# Patient Record
Sex: MALE | Race: BLACK OR AFRICAN AMERICAN
[De-identification: names, ages, dates, MRNs, and addresses within clinical notes are randomized per-mention and may not be internally consistent; named-entity substitution may affect disease eponyms.]

---

## 2018-02-01 ENCOUNTER — HOSPITAL ENCOUNTER (INPATIENT)
Dept: HOSPITAL 91 - E/R | Age: 58
LOS: 9 days | Discharge: HOME | DRG: 147 | End: 2018-02-10
Payer: COMMERCIAL

## 2018-02-01 DIAGNOSIS — K12.30: ICD-10-CM

## 2018-02-01 DIAGNOSIS — C32.1: Primary | ICD-10-CM

## 2018-02-01 DIAGNOSIS — N17.9: ICD-10-CM

## 2018-02-01 DIAGNOSIS — R04.2: ICD-10-CM

## 2018-02-01 DIAGNOSIS — Z87.891: ICD-10-CM

## 2018-02-01 DIAGNOSIS — D50.9: ICD-10-CM

## 2018-02-01 DIAGNOSIS — R13.10: ICD-10-CM

## 2018-02-01 DIAGNOSIS — E86.0: ICD-10-CM

## 2018-02-01 DIAGNOSIS — R59.0: ICD-10-CM

## 2018-02-01 PROCEDURE — 80053 COMPREHEN METABOLIC PANEL: CPT

## 2018-02-01 PROCEDURE — 36561 INSERT TUNNELED CV CATH: CPT

## 2018-02-01 PROCEDURE — 94640 AIRWAY INHALATION TREATMENT: CPT

## 2018-02-01 PROCEDURE — 36415 COLL VENOUS BLD VENIPUNCTURE: CPT

## 2018-02-01 PROCEDURE — 86850 RBC ANTIBODY SCREEN: CPT

## 2018-02-01 PROCEDURE — 85025 COMPLETE CBC W/AUTO DIFF WBC: CPT

## 2018-02-01 PROCEDURE — 71045 X-RAY EXAM CHEST 1 VIEW: CPT

## 2018-02-01 PROCEDURE — 94664 DEMO&/EVAL PT USE INHALER: CPT

## 2018-02-01 PROCEDURE — 85384 FIBRINOGEN ACTIVITY: CPT

## 2018-02-01 PROCEDURE — 85610 PROTHROMBIN TIME: CPT

## 2018-02-01 PROCEDURE — 80061 LIPID PANEL: CPT

## 2018-02-01 PROCEDURE — 76942 ECHO GUIDE FOR BIOPSY: CPT

## 2018-02-01 PROCEDURE — 85014 HEMATOCRIT: CPT

## 2018-02-01 PROCEDURE — 36569 INSJ PICC 5 YR+ W/O IMAGING: CPT

## 2018-02-01 PROCEDURE — 93306 TTE W/DOPPLER COMPLETE: CPT

## 2018-02-01 PROCEDURE — 96375 TX/PRO/DX INJ NEW DRUG ADDON: CPT

## 2018-02-01 PROCEDURE — 99285 EMERGENCY DEPT VISIT HI MDM: CPT

## 2018-02-01 PROCEDURE — 83735 ASSAY OF MAGNESIUM: CPT

## 2018-02-01 PROCEDURE — 76937 US GUIDE VASCULAR ACCESS: CPT

## 2018-02-01 PROCEDURE — 85730 THROMBOPLASTIN TIME PARTIAL: CPT

## 2018-02-01 PROCEDURE — 86901 BLOOD TYPING SEROLOGIC RH(D): CPT

## 2018-02-01 PROCEDURE — 84100 ASSAY OF PHOSPHORUS: CPT

## 2018-02-01 PROCEDURE — 85018 HEMOGLOBIN: CPT

## 2018-02-01 PROCEDURE — 86900 BLOOD TYPING SEROLOGIC ABO: CPT

## 2018-02-01 PROCEDURE — 96374 THER/PROPH/DIAG INJ IV PUSH: CPT

## 2018-02-01 PROCEDURE — 93005 ELECTROCARDIOGRAM TRACING: CPT

## 2018-02-01 PROCEDURE — 80048 BASIC METABOLIC PNL TOTAL CA: CPT

## 2018-02-02 ENCOUNTER — HOSPITAL ENCOUNTER (INPATIENT)
Age: 58
LOS: 8 days | Discharge: HOME | DRG: 147 | End: 2018-02-10

## 2018-02-02 LAB
ADD MAN DIFF?: NO
ALANINE AMINOTRANSFERASE: 32 IU/L (ref 13–69)
ALBUMIN/GLOBULIN RATIO: 1.37
ALBUMIN: 4 G/DL (ref 3.3–4.9)
ALKALINE PHOSPHATASE: 77 IU/L (ref 42–121)
ANION GAP: 11 (ref 8–16)
ASPARTATE AMINO TRANSFERASE: 31 IU/L (ref 15–46)
BASOPHIL #: 0 10^3/UL (ref 0–0.1)
BASOPHILS %: 0.2 % (ref 0–2)
BILIRUBIN,DIRECT: 0 MG/DL (ref 0–0.2)
BILIRUBIN,TOTAL: 0.1 MG/DL (ref 0.2–1.3)
BLOOD UREA NITROGEN: 19 MG/DL (ref 7–20)
CALCIUM: 9.4 MG/DL (ref 8.4–10.2)
CARBON DIOXIDE: 31 MMOL/L (ref 21–31)
CHLORIDE: 104 MMOL/L (ref 97–110)
CREATININE: 1.06 MG/DL (ref 0.61–1.24)
EOSINOPHILS #: 0 10^3/UL (ref 0–0.5)
EOSINOPHILS %: 0.7 % (ref 0–7)
FIBRINOGEN: 343 MG/DL (ref 207–461)
GLOBULIN: 2.9 G/DL (ref 1.3–3.2)
GLUCOSE: 98 MG/DL (ref 70–220)
HEMATOCRIT: 37.3 % (ref 42–52)
HEMATOCRIT: 38.6 % (ref 42–52)
HEMOGLOBIN: 12.4 G/DL (ref 14–18)
HEMOGLOBIN: 12.8 G/DL (ref 14–18)
INR: 1
LYMPHOCYTES #: 1.3 10^3/UL (ref 0.8–2.9)
LYMPHOCYTES %: 23.9 % (ref 15–51)
MEAN CORPUSCULAR HEMOGLOBIN: 31 PG (ref 29–33)
MEAN CORPUSCULAR HGB CONC: 33.2 G/DL (ref 32–37)
MEAN CORPUSCULAR VOLUME: 93.3 FL (ref 82–101)
MEAN PLATELET VOLUME: 11.4 FL (ref 7.4–10.4)
MONOCYTE #: 0.5 10^3/UL (ref 0.3–0.9)
MONOCYTES %: 9.7 % (ref 0–11)
NEUTROPHIL #: 3.5 10^3/UL (ref 1.6–7.5)
NEUTROPHILS %: 65.3 % (ref 39–77)
NUCLEATED RED BLOOD CELLS #: 0 10^3/UL (ref 0–0)
NUCLEATED RED BLOOD CELLS%: 0 /100WBC (ref 0–0)
PARTIAL THROMBOPLASTIN TIME: 37.4 SEC (ref 25–35)
PLATELET COUNT: 177 10^3/UL (ref 140–415)
POTASSIUM: 4 MMOL/L (ref 3.5–5.1)
PROTIME: 13.3 SEC (ref 11.9–14.9)
PT RATIO: 1
RED BLOOD COUNT: 4 10^6/UL (ref 4.7–6.1)
RED CELL DISTRIBUTION WIDTH: 12.7 % (ref 11.5–14.5)
SODIUM: 142 MMOL/L (ref 135–144)
TOTAL PROTEIN: 6.9 G/DL (ref 6.1–8.1)
WHITE BLOOD COUNT: 5.4 10^3/UL (ref 4.8–10.8)

## 2018-02-02 RX ADMIN — FAMOTIDINE 1 MG: 10 INJECTION, SOLUTION INTRAVENOUS at 20:36

## 2018-02-02 RX ADMIN — DOCUSATE SODIUM 1 MG: 100 CAPSULE, LIQUID FILLED ORAL at 20:50

## 2018-02-02 RX ADMIN — HYDROMORPHONE HYDROCHLORIDE 1 MG: 1 INJECTION, SOLUTION INTRAMUSCULAR; INTRAVENOUS; SUBCUTANEOUS at 08:44

## 2018-02-02 RX ADMIN — HYDROMORPHONE HYDROCHLORIDE 1 MG: 1 INJECTION, SOLUTION INTRAMUSCULAR; INTRAVENOUS; SUBCUTANEOUS at 20:50

## 2018-02-02 RX ADMIN — ONDANSETRON HYDROCHLORIDE 1 MG: 2 INJECTION, SOLUTION INTRAMUSCULAR; INTRAVENOUS at 08:43

## 2018-02-02 RX ADMIN — FAMOTIDINE 1 MG: 10 INJECTION, SOLUTION INTRAVENOUS at 08:43

## 2018-02-02 RX ADMIN — THIAMINE HYDROCHLORIDE 1 MLS/HR: 100 INJECTION, SOLUTION INTRAMUSCULAR; INTRAVENOUS at 20:49

## 2018-02-02 RX ADMIN — THIAMINE HYDROCHLORIDE 1 MLS/HR: 100 INJECTION, SOLUTION INTRAMUSCULAR; INTRAVENOUS at 07:45

## 2018-02-02 RX ADMIN — THIAMINE HYDROCHLORIDE 1 MLS/HR: 100 INJECTION, SOLUTION INTRAMUSCULAR; INTRAVENOUS at 16:03

## 2018-02-02 RX ADMIN — HYDROMORPHONE HYDROCHLORIDE 1 MG: 1 INJECTION, SOLUTION INTRAMUSCULAR; INTRAVENOUS; SUBCUTANEOUS at 16:52

## 2018-02-03 LAB
ADD MAN DIFF?: NO
ALANINE AMINOTRANSFERASE: 34 IU/L (ref 13–69)
ALBUMIN/GLOBULIN RATIO: 1.15
ALBUMIN: 3.7 G/DL (ref 3.3–4.9)
ALKALINE PHOSPHATASE: 70 IU/L (ref 42–121)
ANION GAP: 11 (ref 8–16)
ANION GAP: 12 (ref 8–16)
ASPARTATE AMINO TRANSFERASE: 30 IU/L (ref 15–46)
BASOPHIL #: 0 10^3/UL (ref 0–0.1)
BASOPHILS %: 0.2 % (ref 0–2)
BILIRUBIN,DIRECT: 0 MG/DL (ref 0–0.2)
BILIRUBIN,TOTAL: 0.3 MG/DL (ref 0.2–1.3)
BLOOD UREA NITROGEN: 10 MG/DL (ref 7–20)
BLOOD UREA NITROGEN: 9 MG/DL (ref 7–20)
CALCIUM: 8.6 MG/DL (ref 8.4–10.2)
CALCIUM: 9 MG/DL (ref 8.4–10.2)
CARBON DIOXIDE: 31 MMOL/L (ref 21–31)
CARBON DIOXIDE: 32 MMOL/L (ref 21–31)
CHLORIDE: 103 MMOL/L (ref 97–110)
CHLORIDE: 103 MMOL/L (ref 97–110)
CHOL/HDL RATIO: 3 RATIO
CHOLESTEROL: 189 MG/DL (ref 100–200)
CREATININE: 0.77 MG/DL (ref 0.61–1.24)
CREATININE: 0.8 MG/DL (ref 0.61–1.24)
EOSINOPHILS #: 0 10^3/UL (ref 0–0.5)
EOSINOPHILS %: 0.6 % (ref 0–7)
GLOBULIN: 3.2 G/DL (ref 1.3–3.2)
GLUCOSE: 108 MG/DL (ref 70–220)
GLUCOSE: 110 MG/DL (ref 70–220)
HDL CHOLESTEROL: 61 MG/DL (ref 28–71)
HEMATOCRIT: 39.1 % (ref 42–52)
HEMOGLOBIN: 13.1 G/DL (ref 14–18)
LDL CHOLESTEROL,CALCULATED: 119 MG/DL
LYMPHOCYTES #: 1.7 10^3/UL (ref 0.8–2.9)
LYMPHOCYTES %: 26.2 % (ref 15–51)
MAGNESIUM: 2 MG/DL (ref 1.7–2.5)
MEAN CORPUSCULAR HEMOGLOBIN: 31.4 PG (ref 29–33)
MEAN CORPUSCULAR HGB CONC: 33.5 G/DL (ref 32–37)
MEAN CORPUSCULAR VOLUME: 93.8 FL (ref 82–101)
MEAN PLATELET VOLUME: 11.9 FL (ref 7.4–10.4)
MONOCYTE #: 0.6 10^3/UL (ref 0.3–0.9)
MONOCYTES %: 9.5 % (ref 0–11)
NEUTROPHIL #: 4 10^3/UL (ref 1.6–7.5)
NEUTROPHILS %: 63.3 % (ref 39–77)
NUCLEATED RED BLOOD CELLS #: 0 10^3/UL (ref 0–0)
NUCLEATED RED BLOOD CELLS%: 0 /100WBC (ref 0–0)
PLATELET COUNT: 181 10^3/UL (ref 140–415)
POTASSIUM: 4.1 MMOL/L (ref 3.5–5.1)
POTASSIUM: 4.2 MMOL/L (ref 3.5–5.1)
RED BLOOD COUNT: 4.17 10^6/UL (ref 4.7–6.1)
RED CELL DISTRIBUTION WIDTH: 12.3 % (ref 11.5–14.5)
SODIUM: 142 MMOL/L (ref 135–144)
SODIUM: 142 MMOL/L (ref 135–144)
TOTAL PROTEIN: 6.9 G/DL (ref 6.1–8.1)
TRIGLYCERIDES: 45 MG/DL (ref 0–149)
WHITE BLOOD COUNT: 6.3 10^3/UL (ref 4.8–10.8)

## 2018-02-03 RX ADMIN — ACETAMINOPHEN 1 MG: 325 TABLET, FILM COATED ORAL at 08:15

## 2018-02-03 RX ADMIN — FAMOTIDINE 1 MG: 10 INJECTION, SOLUTION INTRAVENOUS at 21:58

## 2018-02-03 RX ADMIN — HYDROMORPHONE HYDROCHLORIDE 1 MG: 1 INJECTION, SOLUTION INTRAMUSCULAR; INTRAVENOUS; SUBCUTANEOUS at 04:24

## 2018-02-03 RX ADMIN — FAMOTIDINE 1 MG: 10 INJECTION, SOLUTION INTRAVENOUS at 08:16

## 2018-02-03 RX ADMIN — HYDROMORPHONE HYDROCHLORIDE 1 MG: 1 INJECTION, SOLUTION INTRAMUSCULAR; INTRAVENOUS; SUBCUTANEOUS at 16:47

## 2018-02-03 RX ADMIN — HYDROMORPHONE HYDROCHLORIDE 1 MG: 1 INJECTION, SOLUTION INTRAMUSCULAR; INTRAVENOUS; SUBCUTANEOUS at 23:48

## 2018-02-03 RX ADMIN — THIAMINE HYDROCHLORIDE 1 MLS/HR: 100 INJECTION, SOLUTION INTRAMUSCULAR; INTRAVENOUS at 21:58

## 2018-02-03 RX ADMIN — LIDOCAINE HYDROCHLORIDE 1 ML: 10 INJECTION, SOLUTION EPIDURAL; INFILTRATION; INTRACAUDAL; PERINEURAL at 09:56

## 2018-02-03 RX ADMIN — THIAMINE HYDROCHLORIDE 1 MLS/HR: 100 INJECTION, SOLUTION INTRAMUSCULAR; INTRAVENOUS at 06:34

## 2018-02-04 LAB
ADD MAN DIFF?: NO
ALANINE AMINOTRANSFERASE: 32 IU/L (ref 13–69)
ALBUMIN/GLOBULIN RATIO: 1.31
ALBUMIN: 3.8 G/DL (ref 3.3–4.9)
ALKALINE PHOSPHATASE: 71 IU/L (ref 42–121)
ANION GAP: 12 (ref 8–16)
ASPARTATE AMINO TRANSFERASE: 28 IU/L (ref 15–46)
BASOPHIL #: 0 10^3/UL (ref 0–0.1)
BASOPHILS %: 0.2 % (ref 0–2)
BILIRUBIN,DIRECT: 0 MG/DL (ref 0–0.2)
BILIRUBIN,TOTAL: 0.3 MG/DL (ref 0.2–1.3)
BLOOD UREA NITROGEN: 9 MG/DL (ref 7–20)
CALCIUM: 8.6 MG/DL (ref 8.4–10.2)
CARBON DIOXIDE: 32 MMOL/L (ref 21–31)
CHLORIDE: 102 MMOL/L (ref 97–110)
CREATININE: 0.79 MG/DL (ref 0.61–1.24)
EOSINOPHILS #: 0 10^3/UL (ref 0–0.5)
EOSINOPHILS %: 0.9 % (ref 0–7)
GLOBULIN: 2.9 G/DL (ref 1.3–3.2)
GLUCOSE: 112 MG/DL (ref 70–220)
HEMATOCRIT: 37.9 % (ref 42–52)
HEMOGLOBIN: 12.5 G/DL (ref 14–18)
LYMPHOCYTES #: 1.1 10^3/UL (ref 0.8–2.9)
LYMPHOCYTES %: 24.9 % (ref 15–51)
MAGNESIUM: 2 MG/DL (ref 1.7–2.5)
MEAN CORPUSCULAR HEMOGLOBIN: 30.7 PG (ref 29–33)
MEAN CORPUSCULAR HGB CONC: 33 G/DL (ref 32–37)
MEAN CORPUSCULAR VOLUME: 93.1 FL (ref 82–101)
MEAN PLATELET VOLUME: 11.8 FL (ref 7.4–10.4)
MONOCYTE #: 0.6 10^3/UL (ref 0.3–0.9)
MONOCYTES %: 12.6 % (ref 0–11)
NEUTROPHIL #: 2.7 10^3/UL (ref 1.6–7.5)
NEUTROPHILS %: 61.2 % (ref 39–77)
NUCLEATED RED BLOOD CELLS #: 0 10^3/UL (ref 0–0)
NUCLEATED RED BLOOD CELLS%: 0 /100WBC (ref 0–0)
PLATELET COUNT: 168 10^3/UL (ref 140–415)
POTASSIUM: 3.8 MMOL/L (ref 3.5–5.1)
RED BLOOD COUNT: 4.07 10^6/UL (ref 4.7–6.1)
RED CELL DISTRIBUTION WIDTH: 12.3 % (ref 11.5–14.5)
SODIUM: 142 MMOL/L (ref 135–144)
TOTAL PROTEIN: 6.7 G/DL (ref 6.1–8.1)
WHITE BLOOD COUNT: 4.5 10^3/UL (ref 4.8–10.8)

## 2018-02-04 RX ADMIN — IPRATROPIUM BROMIDE AND ALBUTEROL SULFATE 1 ML: .5; 3 SOLUTION RESPIRATORY (INHALATION) at 20:07

## 2018-02-04 RX ADMIN — HYDROMORPHONE HYDROCHLORIDE 1 MG: 1 INJECTION, SOLUTION INTRAMUSCULAR; INTRAVENOUS; SUBCUTANEOUS at 16:59

## 2018-02-04 RX ADMIN — HYDROMORPHONE HYDROCHLORIDE 1 MG: 1 INJECTION, SOLUTION INTRAMUSCULAR; INTRAVENOUS; SUBCUTANEOUS at 06:37

## 2018-02-04 RX ADMIN — IPRATROPIUM BROMIDE AND ALBUTEROL SULFATE 1 ML: .5; 3 SOLUTION RESPIRATORY (INHALATION) at 09:25

## 2018-02-04 RX ADMIN — FAMOTIDINE 1 MG: 10 INJECTION, SOLUTION INTRAVENOUS at 09:05

## 2018-02-04 RX ADMIN — GUAIFENESIN AND CODEINE PHOSPHATE 1 ML: 100; 10 SOLUTION ORAL at 16:59

## 2018-02-04 RX ADMIN — HYDROMORPHONE HYDROCHLORIDE 1 MG: 1 INJECTION, SOLUTION INTRAMUSCULAR; INTRAVENOUS; SUBCUTANEOUS at 12:50

## 2018-02-04 RX ADMIN — GUAIFENESIN AND CODEINE PHOSPHATE 1 ML: 100; 10 SOLUTION ORAL at 06:36

## 2018-02-04 RX ADMIN — FAMOTIDINE 1 MG: 10 INJECTION, SOLUTION INTRAVENOUS at 20:25

## 2018-02-04 RX ADMIN — THIAMINE HYDROCHLORIDE 1 MLS/HR: 100 INJECTION, SOLUTION INTRAMUSCULAR; INTRAVENOUS at 06:34

## 2018-02-04 RX ADMIN — THIAMINE HYDROCHLORIDE 1 MLS/HR: 100 INJECTION, SOLUTION INTRAMUSCULAR; INTRAVENOUS at 16:52

## 2018-02-04 RX ADMIN — HYDROMORPHONE HYDROCHLORIDE 1 MG: 1 INJECTION, SOLUTION INTRAMUSCULAR; INTRAVENOUS; SUBCUTANEOUS at 23:01

## 2018-02-05 RX ADMIN — DIPHENHYDRAMINE HYDROCHLORIDE 1 MG: 50 INJECTION, SOLUTION INTRAMUSCULAR; INTRAVENOUS at 17:29

## 2018-02-05 RX ADMIN — HYDROMORPHONE HYDROCHLORIDE 1 MG: 1 INJECTION, SOLUTION INTRAMUSCULAR; INTRAVENOUS; SUBCUTANEOUS at 09:45

## 2018-02-05 RX ADMIN — GUAIFENESIN AND CODEINE PHOSPHATE 1 ML: 100; 10 SOLUTION ORAL at 18:21

## 2018-02-05 RX ADMIN — FAMOTIDINE 1 MG: 10 INJECTION, SOLUTION INTRAVENOUS at 20:31

## 2018-02-05 RX ADMIN — IPRATROPIUM BROMIDE AND ALBUTEROL SULFATE 1 ML: .5; 3 SOLUTION RESPIRATORY (INHALATION) at 16:05

## 2018-02-05 RX ADMIN — FAMOTIDINE 1 MG: 10 INJECTION, SOLUTION INTRAVENOUS at 08:32

## 2018-02-05 RX ADMIN — IPRATROPIUM BROMIDE AND ALBUTEROL SULFATE 1 ML: .5; 3 SOLUTION RESPIRATORY (INHALATION) at 10:07

## 2018-02-05 RX ADMIN — ONDANSETRON HYDROCHLORIDE 1 MLS/HR: 2 INJECTION, SOLUTION INTRAMUSCULAR; INTRAVENOUS at 17:19

## 2018-02-05 RX ADMIN — SODIUM CHLORIDE 1 MLS/HR: 0.9 INJECTION, SOLUTION INTRAVENOUS at 20:49

## 2018-02-05 RX ADMIN — HYDROMORPHONE HYDROCHLORIDE 1 MG: 1 INJECTION, SOLUTION INTRAMUSCULAR; INTRAVENOUS; SUBCUTANEOUS at 05:12

## 2018-02-05 RX ADMIN — HYDROMORPHONE HYDROCHLORIDE 1 MG: 1 INJECTION, SOLUTION INTRAMUSCULAR; INTRAVENOUS; SUBCUTANEOUS at 22:08

## 2018-02-05 RX ADMIN — HYDROMORPHONE HYDROCHLORIDE 1 MG: 1 INJECTION, SOLUTION INTRAMUSCULAR; INTRAVENOUS; SUBCUTANEOUS at 18:21

## 2018-02-05 RX ADMIN — HYDROMORPHONE HYDROCHLORIDE 1 MG: 1 INJECTION, SOLUTION INTRAMUSCULAR; INTRAVENOUS; SUBCUTANEOUS at 14:16

## 2018-02-05 RX ADMIN — THIAMINE HYDROCHLORIDE 1 MLS/HR: 100 INJECTION, SOLUTION INTRAMUSCULAR; INTRAVENOUS at 05:11

## 2018-02-05 RX ADMIN — GUAIFENESIN AND CODEINE PHOSPHATE 1 ML: 100; 10 SOLUTION ORAL at 09:56

## 2018-02-05 RX ADMIN — SODIUM CHLORIDE 1 MLS/HR: 0.9 INJECTION, SOLUTION INTRAVENOUS at 17:51

## 2018-02-05 RX ADMIN — THIAMINE HYDROCHLORIDE 1 MLS/HR: 100 INJECTION, SOLUTION INTRAMUSCULAR; INTRAVENOUS at 14:35

## 2018-02-06 LAB
ADD MAN DIFF?: NO
ALANINE AMINOTRANSFERASE: 27 IU/L (ref 13–69)
ALBUMIN/GLOBULIN RATIO: 1.37
ALBUMIN: 4 G/DL (ref 3.3–4.9)
ALKALINE PHOSPHATASE: 75 IU/L (ref 42–121)
ANION GAP: 13 (ref 8–16)
ASPARTATE AMINO TRANSFERASE: 25 IU/L (ref 15–46)
BASOPHIL #: 0 10^3/UL (ref 0–0.1)
BASOPHILS %: 0 % (ref 0–2)
BILIRUBIN,DIRECT: 0 MG/DL (ref 0–0.2)
BILIRUBIN,TOTAL: 0 MG/DL (ref 0.2–1.3)
BLOOD UREA NITROGEN: 9 MG/DL (ref 7–20)
CALCIUM: 8.4 MG/DL (ref 8.4–10.2)
CARBON DIOXIDE: 31 MMOL/L (ref 21–31)
CHLORIDE: 105 MMOL/L (ref 97–110)
CREATININE: 0.72 MG/DL (ref 0.61–1.24)
EOSINOPHILS #: 0 10^3/UL (ref 0–0.5)
EOSINOPHILS %: 0 % (ref 0–7)
GLOBULIN: 2.9 G/DL (ref 1.3–3.2)
GLUCOSE: 132 MG/DL (ref 70–220)
HEMATOCRIT: 37.2 % (ref 42–52)
HEMOGLOBIN: 12.3 G/DL (ref 14–18)
LYMPHOCYTES #: 0.6 10^3/UL (ref 0.8–2.9)
LYMPHOCYTES %: 12.2 % (ref 15–51)
MAGNESIUM: 2 MG/DL (ref 1.7–2.5)
MEAN CORPUSCULAR HEMOGLOBIN: 31 PG (ref 29–33)
MEAN CORPUSCULAR HGB CONC: 33.1 G/DL (ref 32–37)
MEAN CORPUSCULAR VOLUME: 93.7 FL (ref 82–101)
MEAN PLATELET VOLUME: 12.4 FL (ref 7.4–10.4)
MONOCYTE #: 0.1 10^3/UL (ref 0.3–0.9)
MONOCYTES %: 2.2 % (ref 0–11)
NEUTROPHIL #: 4.2 10^3/UL (ref 1.6–7.5)
NEUTROPHILS %: 85.4 % (ref 39–77)
NUCLEATED RED BLOOD CELLS #: 0 10^3/UL (ref 0–0)
NUCLEATED RED BLOOD CELLS%: 0 /100WBC (ref 0–0)
PHOSPHORUS: 4.2 MG/DL (ref 2.5–4.9)
PLATELET COUNT: 171 10^3/UL (ref 140–415)
POTASSIUM: 4.2 MMOL/L (ref 3.5–5.1)
RED BLOOD COUNT: 3.97 10^6/UL (ref 4.7–6.1)
RED CELL DISTRIBUTION WIDTH: 12.4 % (ref 11.5–14.5)
SODIUM: 145 MMOL/L (ref 135–144)
TOTAL PROTEIN: 6.9 G/DL (ref 6.1–8.1)
WHITE BLOOD COUNT: 4.9 10^3/UL (ref 4.8–10.8)

## 2018-02-06 RX ADMIN — HYDROMORPHONE HYDROCHLORIDE 1 MG: 1 INJECTION, SOLUTION INTRAMUSCULAR; INTRAVENOUS; SUBCUTANEOUS at 07:03

## 2018-02-06 RX ADMIN — LIDOCAINE HYDROCHLORIDE 1 ML: 20 SOLUTION ORAL; TOPICAL at 13:59

## 2018-02-06 RX ADMIN — GUAIFENESIN AND CODEINE PHOSPHATE 1 ML: 100; 10 SOLUTION ORAL at 01:57

## 2018-02-06 RX ADMIN — ONDANSETRON HYDROCHLORIDE 1 MLS/HR: 2 INJECTION, SOLUTION INTRAMUSCULAR; INTRAVENOUS at 23:28

## 2018-02-06 RX ADMIN — HYDROMORPHONE HYDROCHLORIDE 1 MG: 1 INJECTION, SOLUTION INTRAMUSCULAR; INTRAVENOUS; SUBCUTANEOUS at 13:02

## 2018-02-06 RX ADMIN — IPRATROPIUM BROMIDE AND ALBUTEROL SULFATE 1 ML: .5; 3 SOLUTION RESPIRATORY (INHALATION) at 16:10

## 2018-02-06 RX ADMIN — IPRATROPIUM BROMIDE AND ALBUTEROL SULFATE 1 ML: .5; 3 SOLUTION RESPIRATORY (INHALATION) at 00:41

## 2018-02-06 RX ADMIN — IPRATROPIUM BROMIDE AND ALBUTEROL SULFATE 1 ML: .5; 3 SOLUTION RESPIRATORY (INHALATION) at 23:44

## 2018-02-06 RX ADMIN — THIAMINE HYDROCHLORIDE 1 MLS/HR: 100 INJECTION, SOLUTION INTRAMUSCULAR; INTRAVENOUS at 03:00

## 2018-02-06 RX ADMIN — FAMOTIDINE 1 MG: 10 INJECTION, SOLUTION INTRAVENOUS at 21:28

## 2018-02-06 RX ADMIN — THIAMINE HYDROCHLORIDE 1 MLS/HR: 100 INJECTION, SOLUTION INTRAMUSCULAR; INTRAVENOUS at 23:00

## 2018-02-06 RX ADMIN — HYDROMORPHONE HYDROCHLORIDE 1 MG: 1 INJECTION, SOLUTION INTRAMUSCULAR; INTRAVENOUS; SUBCUTANEOUS at 02:00

## 2018-02-06 RX ADMIN — LIDOCAINE HYDROCHLORIDE 1 ML: 20 SOLUTION ORAL; TOPICAL at 23:40

## 2018-02-06 RX ADMIN — FLUOROURACIL 1 MLS/HR: 50 INJECTION, SOLUTION INTRAVENOUS at 00:02

## 2018-02-06 RX ADMIN — DIPHENHYDRAMINE HYDROCHLORIDE 1 MG: 50 INJECTION, SOLUTION INTRAMUSCULAR; INTRAVENOUS at 23:27

## 2018-02-06 RX ADMIN — FAMOTIDINE 1 MG: 10 INJECTION, SOLUTION INTRAVENOUS at 09:34

## 2018-02-06 RX ADMIN — GUAIFENESIN AND CODEINE PHOSPHATE 1 ML: 100; 10 SOLUTION ORAL at 12:59

## 2018-02-06 RX ADMIN — HYDROMORPHONE HYDROCHLORIDE 1 MG: 1 INJECTION, SOLUTION INTRAMUSCULAR; INTRAVENOUS; SUBCUTANEOUS at 18:38

## 2018-02-06 RX ADMIN — THIAMINE HYDROCHLORIDE 1 MLS/HR: 100 INJECTION, SOLUTION INTRAMUSCULAR; INTRAVENOUS at 23:27

## 2018-02-07 LAB
ABNORMAL IP MESSAGE: 1
ADD MAN DIFF?: NO
ANION GAP: 9 (ref 8–16)
BASOPHIL #: 0 10^3/UL (ref 0–0.1)
BASOPHILS %: 0 % (ref 0–2)
BLOOD UREA NITROGEN: 14 MG/DL (ref 7–20)
CALCIUM: 8.6 MG/DL (ref 8.4–10.2)
CARBON DIOXIDE: 34 MMOL/L (ref 21–31)
CHLORIDE: 102 MMOL/L (ref 97–110)
CREATININE: 0.79 MG/DL (ref 0.61–1.24)
EOSINOPHILS #: 0 10^3/UL (ref 0–0.5)
EOSINOPHILS %: 0 % (ref 0–7)
GLUCOSE: 134 MG/DL (ref 70–220)
HEMATOCRIT: 37.6 % (ref 42–52)
HEMOGLOBIN: 12.6 G/DL (ref 14–18)
LYMPHOCYTES #: 0.4 10^3/UL (ref 0.8–2.9)
LYMPHOCYTES %: 7.3 % (ref 15–51)
MAGNESIUM: 2.1 MG/DL (ref 1.7–2.5)
MEAN CORPUSCULAR HEMOGLOBIN: 31 PG (ref 29–33)
MEAN CORPUSCULAR HGB CONC: 33.5 G/DL (ref 32–37)
MEAN CORPUSCULAR VOLUME: 92.6 FL (ref 82–101)
MEAN PLATELET VOLUME: 12.5 FL (ref 7.4–10.4)
MONOCYTE #: 0.2 10^3/UL (ref 0.3–0.9)
MONOCYTES %: 3.1 % (ref 0–11)
NEUTROPHIL #: 5.1 10^3/UL (ref 1.6–7.5)
NEUTROPHILS %: 89.3 % (ref 39–77)
NUCLEATED RED BLOOD CELLS #: 0 10^3/UL (ref 0–0)
NUCLEATED RED BLOOD CELLS%: 0 /100WBC (ref 0–0)
PHOSPHORUS: 3.8 MG/DL (ref 2.5–4.9)
PLATELET COUNT: 135 10^3/UL (ref 140–415)
POSITIVE DIFF: (no result)
POTASSIUM: 4.3 MMOL/L (ref 3.5–5.1)
RED BLOOD COUNT: 4.06 10^6/UL (ref 4.7–6.1)
RED CELL DISTRIBUTION WIDTH: 12.5 % (ref 11.5–14.5)
SODIUM: 141 MMOL/L (ref 135–144)
WHITE BLOOD COUNT: 5.7 10^3/UL (ref 4.8–10.8)

## 2018-02-07 PROCEDURE — 05HN33Z INSERTION OF INFUSION DEVICE INTO LEFT INTERNAL JUGULAR VEIN, PERCUTANEOUS APPROACH: ICD-10-PCS

## 2018-02-07 PROCEDURE — 0JH63XZ INSERTION OF TUNNELED VASCULAR ACCESS DEVICE INTO CHEST SUBCUTANEOUS TISSUE AND FASCIA, PERCUTANEOUS APPROACH: ICD-10-PCS

## 2018-02-07 PROCEDURE — 02HV33Z INSERTION OF INFUSION DEVICE INTO SUPERIOR VENA CAVA, PERCUTANEOUS APPROACH: ICD-10-PCS

## 2018-02-07 PROCEDURE — 3E03305 INTRODUCTION OF OTHER ANTINEOPLASTIC INTO PERIPHERAL VEIN, PERCUTANEOUS APPROACH: ICD-10-PCS

## 2018-02-07 RX ADMIN — THIAMINE HYDROCHLORIDE 1 MLS/HR: 100 INJECTION, SOLUTION INTRAMUSCULAR; INTRAVENOUS at 19:00

## 2018-02-07 RX ADMIN — FAMOTIDINE 1 MG: 10 INJECTION, SOLUTION INTRAVENOUS at 21:42

## 2018-02-07 RX ADMIN — THIAMINE HYDROCHLORIDE 1 MLS/HR: 100 INJECTION, SOLUTION INTRAMUSCULAR; INTRAVENOUS at 09:00

## 2018-02-07 RX ADMIN — ONDANSETRON HYDROCHLORIDE 1 MLS/HR: 2 INJECTION, SOLUTION INTRAMUSCULAR; INTRAVENOUS at 23:22

## 2018-02-07 RX ADMIN — HYDROMORPHONE HYDROCHLORIDE 1 MG: 1 INJECTION, SOLUTION INTRAMUSCULAR; INTRAVENOUS; SUBCUTANEOUS at 14:13

## 2018-02-07 RX ADMIN — LIDOCAINE HYDROCHLORIDE 1 ML: 10; .005 INJECTION, SOLUTION EPIDURAL; INFILTRATION; INTRACAUDAL; PERINEURAL at 11:40

## 2018-02-07 RX ADMIN — THIAMINE HYDROCHLORIDE 1 MLS/HR: 100 INJECTION, SOLUTION INTRAMUSCULAR; INTRAVENOUS at 14:15

## 2018-02-07 RX ADMIN — DIPHENHYDRAMINE HYDROCHLORIDE 1 MG: 50 INJECTION, SOLUTION INTRAMUSCULAR; INTRAVENOUS at 23:22

## 2018-02-07 RX ADMIN — LIDOCAINE HYDROCHLORIDE 1 ML: 20 SOLUTION ORAL; TOPICAL at 14:14

## 2018-02-07 RX ADMIN — HYDROMORPHONE HYDROCHLORIDE 1 MG: 1 INJECTION, SOLUTION INTRAMUSCULAR; INTRAVENOUS; SUBCUTANEOUS at 19:28

## 2018-02-07 RX ADMIN — FAMOTIDINE 1 MG: 10 INJECTION, SOLUTION INTRAVENOUS at 09:02

## 2018-02-07 RX ADMIN — FLUOROURACIL 1 MLS/HR: 50 INJECTION, SOLUTION INTRAVENOUS at 00:24

## 2018-02-07 RX ADMIN — HEPARIN SODIUM 1 UNIT: 1000 INJECTION, SOLUTION INTRAVENOUS; SUBCUTANEOUS at 11:40

## 2018-02-07 RX ADMIN — BUPIVACAINE HYDROCHLORIDE 1 ML: 5 INJECTION, SOLUTION EPIDURAL; INTRACAUDAL; PERINEURAL at 11:39

## 2018-02-07 RX ADMIN — HYDROMORPHONE HYDROCHLORIDE 1 MG: 1 INJECTION, SOLUTION INTRAMUSCULAR; INTRAVENOUS; SUBCUTANEOUS at 23:22

## 2018-02-07 RX ADMIN — GUAIFENESIN AND CODEINE PHOSPHATE 1 ML: 100; 10 SOLUTION ORAL at 14:22

## 2018-02-07 RX ADMIN — HYDROMORPHONE HYDROCHLORIDE 1 MG: 1 INJECTION, SOLUTION INTRAMUSCULAR; INTRAVENOUS; SUBCUTANEOUS at 09:01

## 2018-02-08 LAB
ABNORMAL IP MESSAGE: 1
ADD MAN DIFF?: NO
ANION GAP: 12 (ref 8–16)
BASOPHIL #: 0 10^3/UL (ref 0–0.1)
BASOPHILS %: 0 % (ref 0–2)
BLOOD UREA NITROGEN: 14 MG/DL (ref 7–20)
CALCIUM: 8.3 MG/DL (ref 8.4–10.2)
CARBON DIOXIDE: 31 MMOL/L (ref 21–31)
CHLORIDE: 100 MMOL/L (ref 97–110)
CREATININE: 0.73 MG/DL (ref 0.61–1.24)
EOSINOPHILS #: 0 10^3/UL (ref 0–0.5)
EOSINOPHILS %: 0 % (ref 0–7)
GLUCOSE: 153 MG/DL (ref 70–220)
HEMATOCRIT: 37.9 % (ref 42–52)
HEMOGLOBIN: 12.5 G/DL (ref 14–18)
LYMPHOCYTES #: 0.3 10^3/UL (ref 0.8–2.9)
LYMPHOCYTES %: 6.2 % (ref 15–51)
MAGNESIUM: 2 MG/DL (ref 1.7–2.5)
MEAN CORPUSCULAR HEMOGLOBIN: 30.6 PG (ref 29–33)
MEAN CORPUSCULAR HGB CONC: 33 G/DL (ref 32–37)
MEAN CORPUSCULAR VOLUME: 92.9 FL (ref 82–101)
MEAN PLATELET VOLUME: 13.2 FL (ref 7.4–10.4)
MONOCYTE #: 0.1 10^3/UL (ref 0.3–0.9)
MONOCYTES %: 1.2 % (ref 0–11)
NEUTROPHIL #: 4.6 10^3/UL (ref 1.6–7.5)
NEUTROPHILS %: 92 % (ref 39–77)
NUCLEATED RED BLOOD CELLS #: 0 10^3/UL (ref 0–0)
NUCLEATED RED BLOOD CELLS%: 0 /100WBC (ref 0–0)
PHOSPHORUS: 3.3 MG/DL (ref 2.5–4.9)
PLATELET COUNT: 111 10^3/UL (ref 140–415)
POSITIVE DIFF: (no result)
POTASSIUM: 4 MMOL/L (ref 3.5–5.1)
RED BLOOD COUNT: 4.08 10^6/UL (ref 4.7–6.1)
RED CELL DISTRIBUTION WIDTH: 12.4 % (ref 11.5–14.5)
SODIUM: 139 MMOL/L (ref 135–144)
WHITE BLOOD COUNT: 5 10^3/UL (ref 4.8–10.8)

## 2018-02-08 RX ADMIN — LIDOCAINE HYDROCHLORIDE 1 ML: 20 SOLUTION ORAL; TOPICAL at 23:57

## 2018-02-08 RX ADMIN — HYDROMORPHONE HYDROCHLORIDE 1 MG: 1 INJECTION, SOLUTION INTRAMUSCULAR; INTRAVENOUS; SUBCUTANEOUS at 09:43

## 2018-02-08 RX ADMIN — MAGNESIUM HYDROXIDE 1 ML: 400 SUSPENSION ORAL at 15:15

## 2018-02-08 RX ADMIN — IPRATROPIUM BROMIDE AND ALBUTEROL SULFATE 1 ML: .5; 3 SOLUTION RESPIRATORY (INHALATION) at 12:16

## 2018-02-08 RX ADMIN — FLUOROURACIL 1 MLS/HR: 50 INJECTION, SOLUTION INTRAVENOUS at 01:13

## 2018-02-08 RX ADMIN — HYDROMORPHONE HYDROCHLORIDE 1 MG: 1 INJECTION, SOLUTION INTRAMUSCULAR; INTRAVENOUS; SUBCUTANEOUS at 23:57

## 2018-02-08 RX ADMIN — DIPHENHYDRAMINE HYDROCHLORIDE 1 MG: 50 INJECTION, SOLUTION INTRAMUSCULAR; INTRAVENOUS at 23:58

## 2018-02-08 RX ADMIN — LIDOCAINE HYDROCHLORIDE 1 ML: 20 SOLUTION ORAL; TOPICAL at 18:38

## 2018-02-08 RX ADMIN — HYDROMORPHONE HYDROCHLORIDE 1 MG: 1 INJECTION, SOLUTION INTRAMUSCULAR; INTRAVENOUS; SUBCUTANEOUS at 18:41

## 2018-02-08 RX ADMIN — IPRATROPIUM BROMIDE AND ALBUTEROL SULFATE 1 ML: .5; 3 SOLUTION RESPIRATORY (INHALATION) at 00:41

## 2018-02-08 RX ADMIN — IPRATROPIUM BROMIDE AND ALBUTEROL SULFATE 1 ML: .5; 3 SOLUTION RESPIRATORY (INHALATION) at 09:30

## 2018-02-08 RX ADMIN — HYDROMORPHONE HYDROCHLORIDE 1 MG: 1 INJECTION, SOLUTION INTRAMUSCULAR; INTRAVENOUS; SUBCUTANEOUS at 14:56

## 2018-02-08 RX ADMIN — ONDANSETRON HYDROCHLORIDE 1 MLS/HR: 2 INJECTION, SOLUTION INTRAMUSCULAR; INTRAVENOUS at 23:57

## 2018-02-08 RX ADMIN — THIAMINE HYDROCHLORIDE 1 MLS/HR: 100 INJECTION, SOLUTION INTRAMUSCULAR; INTRAVENOUS at 12:14

## 2018-02-08 RX ADMIN — THIAMINE HYDROCHLORIDE 1 MLS/HR: 100 INJECTION, SOLUTION INTRAMUSCULAR; INTRAVENOUS at 02:15

## 2018-02-08 RX ADMIN — IPRATROPIUM BROMIDE AND ALBUTEROL SULFATE 1 ML: .5; 3 SOLUTION RESPIRATORY (INHALATION) at 23:01

## 2018-02-08 RX ADMIN — IPRATROPIUM BROMIDE AND ALBUTEROL SULFATE 1 ML: .5; 3 SOLUTION RESPIRATORY (INHALATION) at 18:42

## 2018-02-08 RX ADMIN — FAMOTIDINE 1 MG: 10 INJECTION, SOLUTION INTRAVENOUS at 09:40

## 2018-02-08 RX ADMIN — FAMOTIDINE 1 MG: 10 INJECTION, SOLUTION INTRAVENOUS at 20:38

## 2018-02-09 LAB
ABNORMAL IP MESSAGE: 1
ADD MAN DIFF?: NO
ANION GAP: 9 (ref 8–16)
BASOPHIL #: 0 10^3/UL (ref 0–0.1)
BASOPHILS %: 0 % (ref 0–2)
BLOOD UREA NITROGEN: 17 MG/DL (ref 7–20)
CALCIUM: 8 MG/DL (ref 8.4–10.2)
CARBON DIOXIDE: 35 MMOL/L (ref 21–31)
CHLORIDE: 99 MMOL/L (ref 97–110)
CREATININE: 0.75 MG/DL (ref 0.61–1.24)
EOSINOPHILS #: 0 10^3/UL (ref 0–0.5)
EOSINOPHILS %: 0 % (ref 0–7)
GLUCOSE: 157 MG/DL (ref 70–220)
HEMATOCRIT: 36.3 % (ref 42–52)
HEMOGLOBIN: 12 G/DL (ref 14–18)
LYMPHOCYTES #: 0.4 10^3/UL (ref 0.8–2.9)
LYMPHOCYTES %: 6.6 % (ref 15–51)
MAGNESIUM: 2 MG/DL (ref 1.7–2.5)
MEAN CORPUSCULAR HEMOGLOBIN: 30.8 PG (ref 29–33)
MEAN CORPUSCULAR HGB CONC: 33.1 G/DL (ref 32–37)
MEAN CORPUSCULAR VOLUME: 93.1 FL (ref 82–101)
MEAN PLATELET VOLUME: 12.8 FL (ref 7.4–10.4)
MONOCYTE #: 0.2 10^3/UL (ref 0.3–0.9)
MONOCYTES %: 2.6 % (ref 0–11)
NEUTROPHIL #: 5.6 10^3/UL (ref 1.6–7.5)
NEUTROPHILS %: 90.5 % (ref 39–77)
NUCLEATED RED BLOOD CELLS #: 0 10^3/UL (ref 0–0)
NUCLEATED RED BLOOD CELLS%: 0 /100WBC (ref 0–0)
PHOSPHORUS: 2.9 MG/DL (ref 2.5–4.9)
PLATELET COUNT: 114 10^3/UL (ref 140–415)
POSITIVE DIFF: (no result)
POTASSIUM: 4 MMOL/L (ref 3.5–5.1)
RED BLOOD COUNT: 3.9 10^6/UL (ref 4.7–6.1)
RED CELL DISTRIBUTION WIDTH: 12.2 % (ref 11.5–14.5)
SODIUM: 139 MMOL/L (ref 135–144)
WHITE BLOOD COUNT: 6.2 10^3/UL (ref 4.8–10.8)

## 2018-02-09 RX ADMIN — FAMOTIDINE 1 MG: 10 INJECTION, SOLUTION INTRAVENOUS at 08:30

## 2018-02-09 RX ADMIN — HYDROMORPHONE HYDROCHLORIDE 1 MG: 1 INJECTION, SOLUTION INTRAMUSCULAR; INTRAVENOUS; SUBCUTANEOUS at 12:27

## 2018-02-09 RX ADMIN — THIAMINE HYDROCHLORIDE 1 MLS/HR: 100 INJECTION, SOLUTION INTRAMUSCULAR; INTRAVENOUS at 02:45

## 2018-02-09 RX ADMIN — ONDANSETRON HYDROCHLORIDE 1 MLS/HR: 2 INJECTION, SOLUTION INTRAMUSCULAR; INTRAVENOUS at 11:00

## 2018-02-09 RX ADMIN — THIAMINE HYDROCHLORIDE 1 MLS/HR: 100 INJECTION, SOLUTION INTRAMUSCULAR; INTRAVENOUS at 12:28

## 2018-02-09 RX ADMIN — FLUOROURACIL 1 MLS/HR: 50 INJECTION, SOLUTION INTRAVENOUS at 00:55

## 2018-02-09 RX ADMIN — CIPROFLOXACIN HYDROCHLORIDE 1 MG: 500 TABLET, FILM COATED ORAL at 17:07

## 2018-02-09 RX ADMIN — FAMOTIDINE 1 MG: 10 INJECTION, SOLUTION INTRAVENOUS at 21:17

## 2018-02-09 RX ADMIN — THIAMINE HYDROCHLORIDE 1 MLS/HR: 100 INJECTION, SOLUTION INTRAMUSCULAR; INTRAVENOUS at 21:23

## 2018-02-09 RX ADMIN — CIPROFLOXACIN HYDROCHLORIDE 1 MG: 500 TABLET, FILM COATED ORAL at 05:22

## 2018-02-09 RX ADMIN — HYDROMORPHONE HYDROCHLORIDE 1 MG: 1 INJECTION, SOLUTION INTRAMUSCULAR; INTRAVENOUS; SUBCUTANEOUS at 05:20

## 2018-02-10 LAB
ABNORMAL IP MESSAGE: 1
ADD MAN DIFF?: NO
ANION GAP: 9 (ref 8–16)
BASOPHIL #: 0 10^3/UL (ref 0–0.1)
BASOPHILS %: 0 % (ref 0–2)
BLOOD UREA NITROGEN: 14 MG/DL (ref 7–20)
CALCIUM: 8.1 MG/DL (ref 8.4–10.2)
CARBON DIOXIDE: 34 MMOL/L (ref 21–31)
CHLORIDE: 99 MMOL/L (ref 97–110)
CREATININE: 0.7 MG/DL (ref 0.61–1.24)
EOSINOPHILS #: 0 10^3/UL (ref 0–0.5)
EOSINOPHILS %: 0.2 % (ref 0–7)
GLUCOSE: 165 MG/DL (ref 70–220)
HEMATOCRIT: 36.2 % (ref 42–52)
HEMOGLOBIN: 12.4 G/DL (ref 14–18)
LYMPHOCYTES #: 0.4 10^3/UL (ref 0.8–2.9)
LYMPHOCYTES %: 9.2 % (ref 15–51)
MEAN CORPUSCULAR HEMOGLOBIN: 31.3 PG (ref 29–33)
MEAN CORPUSCULAR HGB CONC: 34.3 G/DL (ref 32–37)
MEAN CORPUSCULAR VOLUME: 91.4 FL (ref 82–101)
MEAN PLATELET VOLUME: 13.3 FL (ref 7.4–10.4)
MONOCYTE #: 0.1 10^3/UL (ref 0.3–0.9)
MONOCYTES %: 1.5 % (ref 0–11)
NEUTROPHIL #: 4.1 10^3/UL (ref 1.6–7.5)
NEUTROPHILS %: 88.7 % (ref 39–77)
NUCLEATED RED BLOOD CELLS #: 0 10^3/UL (ref 0–0)
NUCLEATED RED BLOOD CELLS%: 0 /100WBC (ref 0–0)
PLATELET COUNT: 145 10^3/UL (ref 140–415)
POSITIVE DIFF: (no result)
POTASSIUM: 4 MMOL/L (ref 3.5–5.1)
RED BLOOD COUNT: 3.96 10^6/UL (ref 4.7–6.1)
RED CELL DISTRIBUTION WIDTH: 11.9 % (ref 11.5–14.5)
SODIUM: 138 MMOL/L (ref 135–144)
WHITE BLOOD COUNT: 4.6 10^3/UL (ref 4.8–10.8)

## 2018-02-10 RX ADMIN — DIPHENHYDRAMINE HYDROCHLORIDE 1 MG: 50 INJECTION, SOLUTION INTRAMUSCULAR; INTRAVENOUS at 00:23

## 2018-02-10 RX ADMIN — FAMOTIDINE 1 MG: 10 INJECTION, SOLUTION INTRAVENOUS at 08:27

## 2018-02-10 RX ADMIN — FLUOROURACIL 1 MLS/HR: 50 INJECTION, SOLUTION INTRAVENOUS at 00:40

## 2018-02-10 RX ADMIN — THIAMINE HYDROCHLORIDE 1 MLS/HR: 100 INJECTION, SOLUTION INTRAMUSCULAR; INTRAVENOUS at 07:00

## 2018-02-10 RX ADMIN — CIPROFLOXACIN HYDROCHLORIDE 1 MG: 500 TABLET, FILM COATED ORAL at 05:48

## 2018-02-14 ENCOUNTER — HOSPITAL ENCOUNTER (EMERGENCY)
Dept: HOSPITAL 54 - ER | Age: 58
LOS: 1 days | Discharge: ANOTHER HOSPITAL | End: 2018-02-15
Payer: COMMERCIAL

## 2018-02-14 VITALS — WEIGHT: 180 LBS | BODY MASS INDEX: 23.86 KG/M2 | HEIGHT: 73 IN

## 2018-02-14 DIAGNOSIS — K44.9: ICD-10-CM

## 2018-02-14 DIAGNOSIS — E86.0: ICD-10-CM

## 2018-02-14 DIAGNOSIS — C32.9: ICD-10-CM

## 2018-02-14 DIAGNOSIS — I10: ICD-10-CM

## 2018-02-14 DIAGNOSIS — R11.2: Primary | ICD-10-CM

## 2018-02-14 DIAGNOSIS — Z85.21: ICD-10-CM

## 2018-02-14 LAB
ALBUMIN SERPL BCP-MCNC: 3.1 G/DL (ref 3.4–5)
ALP SERPL-CCNC: 68 U/L (ref 46–116)
ALT SERPL W P-5'-P-CCNC: 16 U/L (ref 12–78)
APTT PPP: 28 SEC (ref 23–34)
AST SERPL W P-5'-P-CCNC: 18 U/L (ref 15–37)
BASOPHILS # BLD AUTO: 0 /CMM (ref 0–0.2)
BASOPHILS NFR BLD AUTO: 1 % (ref 0–2)
BILIRUB DIRECT SERPL-MCNC: 0.2 MG/DL (ref 0–0.2)
BILIRUB SERPL-MCNC: 1 MG/DL (ref 0.2–1)
BUN SERPL-MCNC: 20 MG/DL (ref 7–18)
CALCIUM SERPL-MCNC: 8.3 MG/DL (ref 8.5–10.1)
CHLORIDE SERPL-SCNC: 102 MMOL/L (ref 98–107)
CO2 SERPL-SCNC: 36 MMOL/L (ref 21–32)
CREAT SERPL-MCNC: 1.2 MG/DL (ref 0.6–1.3)
EOSINOPHIL # BLD AUTO: 0 /CMM (ref 0–0.7)
EOSINOPHIL NFR BLD AUTO: 2.1 % (ref 0–6)
EOSINOPHIL NFR BLD MANUAL: 2 % (ref 0–4)
GLUCOSE SERPL-MCNC: 134 MG/DL (ref 74–106)
HCT VFR BLD AUTO: 39 % (ref 39–51)
HGB BLD-MCNC: 13.4 G/DL (ref 13.5–17.5)
INR PPP: 1.12 (ref 0.85–1.15)
LIPASE SERPL-CCNC: 113 U/L (ref 73–393)
LYMPHOCYTES NFR BLD AUTO: 0.4 /CMM (ref 0.8–4.8)
LYMPHOCYTES NFR BLD AUTO: 49.3 % (ref 20–44)
LYMPHOCYTES NFR BLD MANUAL: 58 % (ref 16–48)
MCH RBC QN AUTO: 31 PG (ref 26–33)
MCHC RBC AUTO-ENTMCNC: 34 G/DL (ref 31–36)
MCV RBC AUTO: 92 FL (ref 80–96)
MONOCYTES NFR BLD AUTO: 0.1 /CMM (ref 0.1–1.3)
MONOCYTES NFR BLD AUTO: 18.7 % (ref 2–12)
MONOCYTES NFR BLD MANUAL: 24 % (ref 0–11)
NEUTROPHILS # BLD AUTO: 0.2 /CMM (ref 1.8–8.9)
NEUTROPHILS NFR BLD AUTO: 28.9 % (ref 43–81)
NEUTS BAND NFR BLD MANUAL: 2 % (ref 0–5)
NEUTS SEG NFR BLD MANUAL: 14 % (ref 42–76)
PLATELET # BLD AUTO: 122 /CMM (ref 150–450)
POTASSIUM SERPL-SCNC: 3.4 MMOL/L (ref 3.5–5.1)
PROT SERPL-MCNC: 7 G/DL (ref 6.4–8.2)
RBC # BLD AUTO: 4.29 MIL/UL (ref 4.5–6)
RDW COEFFICIENT OF VARIATION: 12.7 (ref 11.5–15)
SODIUM SERPL-SCNC: 139 MMOL/L (ref 136–145)
WBC NRBC COR # BLD AUTO: 0.8 K/UL (ref 4.3–11)

## 2018-02-14 PROCEDURE — 85025 COMPLETE CBC W/AUTO DIFF WBC: CPT

## 2018-02-14 PROCEDURE — 93005 ELECTROCARDIOGRAM TRACING: CPT

## 2018-02-14 PROCEDURE — Z7610: HCPCS

## 2018-02-14 PROCEDURE — 80048 BASIC METABOLIC PNL TOTAL CA: CPT

## 2018-02-14 PROCEDURE — 85730 THROMBOPLASTIN TIME PARTIAL: CPT

## 2018-02-14 PROCEDURE — 99285 EMERGENCY DEPT VISIT HI MDM: CPT

## 2018-02-14 PROCEDURE — 71045 X-RAY EXAM CHEST 1 VIEW: CPT

## 2018-02-14 PROCEDURE — 83690 ASSAY OF LIPASE: CPT

## 2018-02-14 PROCEDURE — 80076 HEPATIC FUNCTION PANEL: CPT

## 2018-02-14 PROCEDURE — 96361 HYDRATE IV INFUSION ADD-ON: CPT

## 2018-02-14 PROCEDURE — C9113 INJ PANTOPRAZOLE SODIUM, VIA: HCPCS

## 2018-02-14 PROCEDURE — 96374 THER/PROPH/DIAG INJ IV PUSH: CPT

## 2018-02-14 PROCEDURE — 83735 ASSAY OF MAGNESIUM: CPT

## 2018-02-14 PROCEDURE — 86850 RBC ANTIBODY SCREEN: CPT

## 2018-02-14 PROCEDURE — A4606 OXYGEN PROBE USED W OXIMETER: HCPCS

## 2018-02-14 PROCEDURE — 84484 ASSAY OF TROPONIN QUANT: CPT

## 2018-02-14 PROCEDURE — 94640 AIRWAY INHALATION TREATMENT: CPT

## 2018-02-14 PROCEDURE — 36415 COLL VENOUS BLD VENIPUNCTURE: CPT

## 2018-02-14 PROCEDURE — 96375 TX/PRO/DX INJ NEW DRUG ADDON: CPT

## 2018-02-14 PROCEDURE — 87081 CULTURE SCREEN ONLY: CPT

## 2018-02-14 NOTE — NUR
[id number] FROM HOME FOR N/V X 2 DAYS, STARTED CHEMO LAST MONDAY FOR THROAT CA. PT 
AOX3 RR EVEN AND UNLABORED. NO SOB NOTED. NAD NOTED. NO NVD AT THIS TIME. PT 
GOWNED AND PLACED ON MONITOR WAITING FOR MD COOPER. PT NOTED WITH PICC LINE ON 
JANNETH. INTACT AND PATENT. NO S/S INFECTION OR INFILATION NOTED. PAC MATT AT 
BEDSIDE FOR EVAL.

## 2018-02-15 VITALS — SYSTOLIC BLOOD PRESSURE: 120 MMHG | DIASTOLIC BLOOD PRESSURE: 71 MMHG

## 2018-02-15 NOTE — NUR
REPORT GIVEN TO EMT [facility] FOR ANDREA. PT AWARE OF TRANSFER TO [facility]. PICC LINE INTACT AND PATENT. NO S/S INFECTION OF 
INFILTRATION NOTED. PT WITH ALL PERSONAL BELONGINGS. TRANSFER PAPER WORK WITH 
EMT, PT TRANSFERRED VIA GURNEY TO ACCEPTING FACILITY. PER AMBULANCE TOOK OVER 
CARE